# Patient Record
Sex: FEMALE | Race: WHITE | NOT HISPANIC OR LATINO | Employment: OTHER | URBAN - METROPOLITAN AREA
[De-identification: names, ages, dates, MRNs, and addresses within clinical notes are randomized per-mention and may not be internally consistent; named-entity substitution may affect disease eponyms.]

---

## 2023-10-02 ENCOUNTER — TELEPHONE (OUTPATIENT)
Dept: FAMILY MEDICINE | Facility: CLINIC | Age: 88
End: 2023-10-02
Payer: MEDICARE

## 2023-10-02 NOTE — TELEPHONE ENCOUNTER
----- Message from Sabiha Bear sent at 10/2/2023  1:07 PM CDT -----  Regarding: appointment  Contact: patient  Type:  Sooner Appointment Request    Caller is requesting a sooner appointment.  Caller declined first available appointment listed below.  Caller will not accept being placed on the waitlist and is requesting a message be sent to doctor.    Name of Caller:  patient  When is the first available appointment?    Symptoms:  medication  Best Call Back Number:  [phone] (home)     Additional Information:  Please call patient to schedule.  Thanks!

## 2023-10-02 NOTE — TELEPHONE ENCOUNTER
----- Message from Veronique Hebert sent at 10/2/2023  1:45 PM CDT -----  Contact: Patient  Type:  Patient Returning Call    Who Called:  Patient  Who Left Message for Patient:  Enriqueta  Does the patient know what this is regarding?:  Possibly an appointment    Would the patient rather a call back or a response via [facility]?   Call back  Best Call Back Number:  [phone]    Additional Information:  States she is returning a missed call - please call to advise - thank you

## 2023-10-23 ENCOUNTER — OFFICE VISIT (OUTPATIENT)
Dept: FAMILY MEDICINE | Facility: CLINIC | Age: 88
End: 2023-10-23
Payer: MEDICARE

## 2023-10-23 VITALS
DIASTOLIC BLOOD PRESSURE: 84 MMHG | HEART RATE: 62 BPM | HEIGHT: 66 IN | WEIGHT: 190.5 LBS | SYSTOLIC BLOOD PRESSURE: 200 MMHG | BODY MASS INDEX: 30.62 KG/M2 | OXYGEN SATURATION: 95 %

## 2023-10-23 DIAGNOSIS — N18.31 CHRONIC KIDNEY DISEASE, STAGE 3A: ICD-10-CM

## 2023-10-23 DIAGNOSIS — Z95.0 PACEMAKER: ICD-10-CM

## 2023-10-23 DIAGNOSIS — E78.2 MIXED HYPERLIPIDEMIA: ICD-10-CM

## 2023-10-23 DIAGNOSIS — I48.0 PAROXYSMAL ATRIAL FIBRILLATION: ICD-10-CM

## 2023-10-23 DIAGNOSIS — R00.1 SYMPTOMATIC BRADYCARDIA: ICD-10-CM

## 2023-10-23 DIAGNOSIS — I10 PRIMARY HYPERTENSION: Primary | ICD-10-CM

## 2023-10-23 DIAGNOSIS — R73.03 PREDIABETES: ICD-10-CM

## 2023-10-23 DIAGNOSIS — H35.30 MACULAR DEGENERATION OF BOTH EYES, UNSPECIFIED TYPE: ICD-10-CM

## 2023-10-23 PROCEDURE — 99213 OFFICE O/P EST LOW 20 MIN: CPT | Mod: PBBFAC,PO | Performed by: STUDENT IN AN ORGANIZED HEALTH CARE EDUCATION/TRAINING PROGRAM

## 2023-10-23 PROCEDURE — 99999 PR PBB SHADOW E&M-EST. PATIENT-LVL III: CPT | Mod: PBBFAC,,, | Performed by: STUDENT IN AN ORGANIZED HEALTH CARE EDUCATION/TRAINING PROGRAM

## 2023-10-23 PROCEDURE — 90677 PCV20 VACCINE IM: CPT | Mod: PBBFAC,PO

## 2023-10-23 PROCEDURE — 99999PBSHW FLU VACCINE - QUADRIVALENT - ADJUVANTED: Mod: PBBFAC,,,

## 2023-10-23 PROCEDURE — 99999PBSHW PNEUMOCOCCAL CONJUGATE VACCINE 20-VALENT: Mod: PBBFAC,,,

## 2023-10-23 PROCEDURE — 99204 OFFICE O/P NEW MOD 45 MIN: CPT | Mod: S$PBB,,, | Performed by: STUDENT IN AN ORGANIZED HEALTH CARE EDUCATION/TRAINING PROGRAM

## 2023-10-23 PROCEDURE — 99999PBSHW PNEUMOCOCCAL CONJUGATE VACCINE 20-VALENT: ICD-10-PCS | Mod: PBBFAC,,,

## 2023-10-23 PROCEDURE — 99999 PR PBB SHADOW E&M-EST. PATIENT-LVL III: ICD-10-PCS | Mod: PBBFAC,,, | Performed by: STUDENT IN AN ORGANIZED HEALTH CARE EDUCATION/TRAINING PROGRAM

## 2023-10-23 PROCEDURE — G0008 ADMIN INFLUENZA VIRUS VAC: HCPCS | Mod: PBBFAC,PO

## 2023-10-23 PROCEDURE — 99204 PR OFFICE/OUTPT VISIT, NEW, LEVL IV, 45-59 MIN: ICD-10-PCS | Mod: S$PBB,,, | Performed by: STUDENT IN AN ORGANIZED HEALTH CARE EDUCATION/TRAINING PROGRAM

## 2023-10-23 RX ORDER — IBUPROFEN 100 MG/5ML
1 SUSPENSION, ORAL (FINAL DOSE FORM) ORAL EVERY MORNING
COMMUNITY

## 2023-10-23 RX ORDER — CHOLECALCIFEROL (VITAMIN D3) 25 MCG
1 TABLET ORAL EVERY MORNING
COMMUNITY

## 2023-10-23 RX ORDER — AMOXICILLIN 500 MG
2 CAPSULE ORAL
COMMUNITY

## 2023-10-23 RX ORDER — AMIODARONE HYDROCHLORIDE 100 MG/1
100 TABLET ORAL DAILY
COMMUNITY

## 2023-10-23 RX ORDER — OLMESARTAN MEDOXOMIL 40 MG/1
1 TABLET ORAL DAILY
COMMUNITY

## 2023-10-23 RX ORDER — HYDROCHLOROTHIAZIDE 12.5 MG/1
25 CAPSULE ORAL
COMMUNITY
Start: 2023-10-14 | End: 2023-12-20

## 2023-10-23 NOTE — ASSESSMENT & PLAN NOTE
Elevated in today's clinic visit. No signs of hypertensive emergency. Patient has brought her home blood pressure cuff to a visit as recently as last month and it was consistent with clinic readings. Home clinic readings ranged from 120//89 mostly - occasionally systolic 150s. Given lack of emergent symptoms and normal home pressures, I will not make any changes for now.

## 2023-10-23 NOTE — ASSESSMENT & PLAN NOTE
Briefly discussed A1C 6.4 from July. Paitent should not need medication at this point - only dietary intervention and regular exercise. Will track A1C at next visit.

## 2023-10-23 NOTE — ASSESSMENT & PLAN NOTE
Patient's cholesterol levels were elevated on lab work in July. Currently not on statin medication. I am unclear on the benefit of starting a statin medication at this point. Continue to monitor. Encouraged discussion with cardiologist.

## 2023-10-23 NOTE — PROGRESS NOTES
Name: Leslye Last  MRN: [id number]  : 1935    HPI  Patient is here to establish care. Primary concerns are handled through her specialists. Within the last few weeks, ophthalmology has diagnosed macular degeneration and cardiology has found bouts of atrial fibrillation, stopping patient's sotalol and switching to amiodarone.    Review of Systems   Respiratory:  Positive for shortness of breath (on exertion).    Cardiovascular:  Negative for chest pain.   Gastrointestinal:  Negative for constipation, diarrhea, nausea and vomiting.   Neurological:  Negative for dizziness and light-headedness.   Psychiatric/Behavioral:  Positive for sleep disturbance (nocturia). Negative for dysphoric mood.         Patient Active Problem List   Diagnosis    Macular degeneration of both eyes    Pacemaker    Symptomatic bradycardia    Paroxysmal atrial fibrillation    Primary hypertension    Prediabetes    Mixed hyperlipidemia    Chronic kidney disease, stage 3a       Current Outpatient Medications on File Prior to Visit   Medication Sig Dispense Refill    amiodarone (PACERONE) 100 MG Tab Take 100 mg by mouth once daily.      ascorbic acid, vitamin C, (VITAMIN C) 1000 MG tablet Take 1 tablet by mouth every morning.      hydroCHLOROthiazide (MICROZIDE) 12.5 mg capsule Take 12.5 mg by mouth.      multivitamin capsule Take 1 capsule by mouth every morning.      olmesartan (BENICAR) 40 MG tablet Take 1 tablet by mouth once daily.      omega-3 fatty acids/fish oil (FISH OIL-OMEGA-3 FATTY ACIDS) 300-1,000 mg capsule Take 2 g by mouth.      vit C/E/Zn/coppr/lutein/zeaxan (PRESERVISION AREDS-2 ORAL) Take by mouth.      vitamin D (VITAMIN D3) 1000 units Tab Take 1 tablet by mouth every morning.      [DISCONTINUED] hydrALAZINE (APRESOLINE) 10 MG tablet Take 10 mg by mouth every 12 (twelve) hours.      [DISCONTINUED] losartan (COZAAR) 100 MG tablet Take 100 mg by mouth once daily.      [DISCONTINUED] metoprolol succinate (TOPROL-XL) 50 MG 24 hr  tablet Take 50 mg by mouth once daily.      [DISCONTINUED] predniSONE (DELTASONE) 20 MG tablet 20mg 2x/day x 2 days, then 20mg daily x 3 days then stop 7 tablet 0     No current facility-administered medications on file prior to visit.       Past Medical History:   Diagnosis Date    Bradycardia     s/p pacemaker    Diverticulitis 2010    Hypertension        Past Surgical History:   Procedure Laterality Date    EYE SURGERY      HYSTERECTOMY      INSERTION OF PACEMAKER  12/2022    LAPAROSCOPIC LEFT COLON RESECTION  2010    diverticulitis    LUMBAR SPINE SURGERY  1981    SHOULDER ARTHROSCOPY Right         No family history on file.    Social History     Socioeconomic History    Marital status:    Tobacco Use    Smoking status: Never    Smokeless tobacco: Never   Substance and Sexual Activity    Alcohol use: Yes     Comment: Less than once monthly - no more than a glass at a time    Drug use: Never   Social History Narrative    Lives alone at [facility]. Has family in the area. Enjoys reading, exercising.        Review of patient's allergies indicates:   Allergen Reactions    Amlodipine Edema and Other (See Comments)     swelling      Hydralazine Edema and Swelling    Sulfur Rash        Vitals:    10/23/23 1413   BP: (!) 200/84   Pulse:         Physical Exam  Constitutional:       General: She is not in acute distress.     Appearance: Normal appearance. She is well-developed.   HENT:      Head: Normocephalic and atraumatic.      Right Ear: External ear normal.      Left Ear: External ear normal.   Eyes:      Conjunctiva/sclera: Conjunctivae normal.   Cardiovascular:      Rate and Rhythm: Normal rate and regular rhythm.      Heart sounds: No murmur heard.     No friction rub. No gallop.   Pulmonary:      Effort: Pulmonary effort is normal. No respiratory distress.      Breath sounds: No wheezing, rhonchi or rales.   Abdominal:      General: Abdomen is flat. There is no distension.    Musculoskeletal:         General: No swelling or deformity.      Right lower le+ Pitting Edema present.      Left lower le+ Pitting Edema present.   Skin:     General: Skin is warm and dry.      Coloration: Skin is not jaundiced.   Neurological:      Mental Status: She is alert and oriented to person, place, and time. Mental status is at baseline.   Psychiatric:         Attention and Perception: Attention and perception normal.         Mood and Affect: Mood normal.         Speech: Speech normal.         Behavior: Behavior normal. Behavior is cooperative.         Thought Content: Thought content normal.         Cognition and Memory: Cognition normal.         Judgment: Judgment normal.          1. Primary hypertension  Assessment & Plan:  Elevated in today's clinic visit. No signs of hypertensive emergency. Patient has brought her home blood pressure cuff to a visit as recently as last month and it was consistent with clinic readings. Home clinic readings ranged from 120//89 mostly - occasionally systolic 150s. Given lack of emergent symptoms and normal home pressures, I will not make any changes for now.      2. Chronic kidney disease, stage 3a  Assessment & Plan:  Kidney labs from July were stable compared to the prior year. Continue to monitor.      3. Mixed hyperlipidemia  Assessment & Plan:  Patient's cholesterol levels were elevated on lab work in July. Currently not on statin medication. I am unclear on the benefit of starting a statin medication at this point. Continue to monitor. Encouraged discussion with cardiologist.      4. Prediabetes  Assessment & Plan:  Briefly discussed A1C 6.4 from July. Paitent should not need medication at this point - only dietary intervention and regular exercise. Will track A1C at next visit.      5. Macular degeneration of both eyes, unspecified type  Overview:  Follows Dr. Melgar      6. Pacemaker    7. Symptomatic bradycardia    8. Paroxysmal atrial  fibrillation    Other orders  -     (In Office Administered) Pneumococcal Conjugate Vaccine (20 Valent) (IM) (Preferred)  -     Influenza - Quadrivalent (Adjuvanted)        Follow up in 6 months.    Gianfranco Brandon MD  10/23/2023

## 2023-11-27 ENCOUNTER — OFFICE VISIT (OUTPATIENT)
Dept: URGENT CARE | Facility: CLINIC | Age: 88
End: 2023-11-27
Payer: MEDICARE

## 2023-11-27 VITALS
BODY MASS INDEX: 30.53 KG/M2 | SYSTOLIC BLOOD PRESSURE: 170 MMHG | OXYGEN SATURATION: 97 % | TEMPERATURE: 99 F | HEIGHT: 66 IN | DIASTOLIC BLOOD PRESSURE: 80 MMHG | HEART RATE: 77 BPM | RESPIRATION RATE: 16 BRPM | WEIGHT: 190 LBS

## 2023-11-27 DIAGNOSIS — R06.2 WHEEZING: ICD-10-CM

## 2023-11-27 DIAGNOSIS — J20.8 ACUTE BACTERIAL BRONCHITIS: Primary | ICD-10-CM

## 2023-11-27 DIAGNOSIS — R05.9 COUGH, UNSPECIFIED TYPE: ICD-10-CM

## 2023-11-27 DIAGNOSIS — B96.89 ACUTE BACTERIAL BRONCHITIS: Primary | ICD-10-CM

## 2023-11-27 DIAGNOSIS — R09.81 NASAL CONGESTION: ICD-10-CM

## 2023-11-27 LAB
CTP QC/QA: YES
SARS-COV-2 AG RESP QL IA.RAPID: NEGATIVE

## 2023-11-27 PROCEDURE — 99213 OFFICE O/P EST LOW 20 MIN: CPT | Mod: S$GLB,,, | Performed by: PHYSICIAN ASSISTANT

## 2023-11-27 PROCEDURE — 99213 PR OFFICE/OUTPT VISIT, EST, LEVL III, 20-29 MIN: ICD-10-PCS | Mod: S$GLB,,, | Performed by: PHYSICIAN ASSISTANT

## 2023-11-27 PROCEDURE — 87811 SARS CORONAVIRUS 2 ANTIGEN POCT, MANUAL READ: ICD-10-PCS | Mod: QW,S$GLB,, | Performed by: PHYSICIAN ASSISTANT

## 2023-11-27 PROCEDURE — 87811 SARS-COV-2 COVID19 W/OPTIC: CPT | Mod: QW,S$GLB,, | Performed by: PHYSICIAN ASSISTANT

## 2023-11-27 RX ORDER — ALBUTEROL SULFATE 90 UG/1
2 AEROSOL, METERED RESPIRATORY (INHALATION) EVERY 6 HOURS PRN
Qty: 18 G | Refills: 3 | Status: SHIPPED | OUTPATIENT
Start: 2023-11-27

## 2023-11-27 RX ORDER — DOXYCYCLINE HYCLATE 100 MG
100 TABLET ORAL 2 TIMES DAILY
Qty: 20 TABLET | Refills: 0 | Status: SHIPPED | OUTPATIENT
Start: 2023-11-27 | End: 2023-12-07

## 2023-11-27 RX ORDER — AMOXICILLIN AND CLAVULANATE POTASSIUM 875; 125 MG/1; MG/1
1 TABLET, FILM COATED ORAL 2 TIMES DAILY
Qty: 20 TABLET | Refills: 0 | Status: SHIPPED | OUTPATIENT
Start: 2023-11-27 | End: 2023-12-07

## 2023-11-27 RX ORDER — PREDNISONE 20 MG/1
TABLET ORAL
Qty: 10 TABLET | Refills: 0 | Status: SHIPPED | OUTPATIENT
Start: 2023-11-27 | End: 2023-12-20

## 2023-11-27 NOTE — PROGRESS NOTES
"Subjective:      Patient ID: Leslye Last is a 88 y.o. female.    Vitals:  height is 5' 6" (1.676 m) and weight is 86.2 kg (190 lb). Her oral temperature is 98.7 °F (37.1 °C). Her blood pressure is 170/80 (abnormal) and her pulse is 77. Her respiration is 16 and oxygen saturation is 97%.     Chief Complaint: Sinus Problem    Sinus Problem  This is a new problem. The current episode started 1 to 4 weeks ago. The problem has been gradually worsening since onset. There has been no fever. Her pain is at a severity of 3/10. The pain is mild. Associated symptoms include congestion, coughing, headaches, sinus pressure and a sore throat. Pertinent negatives include no chills, diaphoresis, ear pain, hoarse voice, neck pain, shortness of breath, sneezing or swollen glands. Past treatments include nothing.     Constitution: Negative for chills, sweating, fatigue and fever.   HENT:  Positive for congestion, postnasal drip, sinus pain, sinus pressure and sore throat. Negative for ear pain, drooling, nosebleeds, foreign body in nose, trouble swallowing and voice change.    Neck: Negative for neck pain, neck stiffness, painful lymph nodes and neck swelling.   Cardiovascular:  Negative for chest pain, leg swelling, palpitations, sob on exertion and passing out.   Eyes:  Negative for eye pain, eye redness, photophobia, double vision, blurred vision and eyelid swelling.   Respiratory:  Positive for cough and wheezing. Negative for chest tightness, sputum production, bloody sputum, shortness of breath and stridor.    Gastrointestinal:  Negative for abdominal pain, abdominal bloating, nausea, vomiting, constipation, diarrhea and heartburn.   Musculoskeletal:  Negative for joint pain, joint swelling, abnormal ROM of joint, back pain, muscle cramps and muscle ache.   Skin:  Negative for rash and hives.   Allergic/Immunologic: Negative for seasonal allergies, food allergies, hives, itching and sneezing.   Neurological:  Positive for " headaches. Negative for dizziness, light-headedness, passing out, facial drooping, speech difficulty, loss of balance, altered mental status, loss of consciousness and seizures.   Hematologic/Lymphatic: Negative for swollen lymph nodes.   Psychiatric/Behavioral:  Negative for altered mental status and nervous/anxious. The patient is not nervous/anxious.       Objective:     Physical Exam   Constitutional: She is oriented to person, place, and time. She appears well-developed. She is cooperative.  Non-toxic appearance. She does not appear ill. No distress.   HENT:   Head: Normocephalic and atraumatic.   Ears:   Right Ear: Hearing, tympanic membrane, external ear and ear canal normal.   Left Ear: Hearing, tympanic membrane, external ear and ear canal normal.   Nose: Mucosal edema and rhinorrhea present. No nasal deformity. No epistaxis. Right sinus exhibits no maxillary sinus tenderness and no frontal sinus tenderness. Left sinus exhibits no maxillary sinus tenderness and no frontal sinus tenderness.   Mouth/Throat: Uvula is midline and mucous membranes are normal. No trismus in the jaw. Normal dentition. No uvula swelling. Posterior oropharyngeal erythema and cobblestoning present. No oropharyngeal exudate, posterior oropharyngeal edema or tonsillar abscesses. No tonsillar exudate.   Eyes: Conjunctivae and lids are normal. No scleral icterus.   Neck: Trachea normal and phonation normal. Neck supple. No edema present. No erythema present. No neck rigidity present.   Cardiovascular: Normal rate, regular rhythm, normal heart sounds and normal pulses.   Pulmonary/Chest: Effort normal. No accessory muscle usage or stridor. No respiratory distress. She has decreased breath sounds. She has wheezes. She has no rhonchi. She has no rales.   Abdominal: Normal appearance.   Musculoskeletal: Normal range of motion.         General: No deformity or edema. Normal range of motion.   Lymphadenopathy:     She has no cervical  adenopathy.   Neurological: She is alert and oriented to person, place, and time. She exhibits normal muscle tone. Coordination normal.   Skin: Skin is warm, dry, intact, not diaphoretic, not pale and no rash. Capillary refill takes less than 2 seconds.   Psychiatric: Her speech is normal and behavior is normal. Judgment and thought content normal.   Nursing note and vitals reviewed.    Results for orders placed or performed in visit on 11/27/23   SARS Coronavirus 2 Antigen, POCT Manual Read   Result Value Ref Range    SARS Coronavirus 2 Antigen Negative Negative     Acceptable Yes      Assessment:     1. Acute bacterial bronchitis    2. Wheezing    3. Cough, unspecified type    4. Nasal congestion      Plan:   Discussed the limitations in the urgent care setting with patient. Discussed DDX and treatment options with patient. Discussed that we do not have XRAY on site today at this  location. Will go ahead and treat for possible bronchitis/pneumonia at this time. Advised close follow-up with PCP and/or Specialist for further evaluation as needed. Strict ER precautions given to patient as well. Patient aware, verbalized understanding and agreed with plan of care.    Acute bacterial bronchitis    Wheezing  -     SARS Coronavirus 2 Antigen, POCT Manual Read  -     albuterol (PROVENTIL HFA) 90 mcg/actuation inhaler; Inhale 2 puffs into the lungs every 6 (six) hours as needed for Wheezing or Shortness of Breath. Rescue  Dispense: 18 g; Refill: 3    Cough, unspecified type  -     SARS Coronavirus 2 Antigen, POCT Manual Read  -     albuterol (PROVENTIL HFA) 90 mcg/actuation inhaler; Inhale 2 puffs into the lungs every 6 (six) hours as needed for Wheezing or Shortness of Breath. Rescue  Dispense: 18 g; Refill: 3    Nasal congestion  -     SARS Coronavirus 2 Antigen, POCT Manual Read    Other orders  -     amoxicillin-clavulanate 875-125mg (AUGMENTIN) 875-125 mg per tablet; Take 1 tablet by mouth 2 (two)  times daily. for 10 days  Dispense: 20 tablet; Refill: 0  -     doxycycline (VIBRA-TABS) 100 MG tablet; Take 1 tablet (100 mg total) by mouth 2 (two) times daily. for 10 days  Dispense: 20 tablet; Refill: 0  -     predniSONE (DELTASONE) 20 MG tablet; Take 40mg (2 tablets) x 2 days, 30mg (1.5 tablets) x 2 days, 20mg (1 tablet) x 2 days, 10mg (0.5 tablet) x 2 days.  Dispense: 10 tablet; Refill: 0      There are no Patient Instructions on file for this visit.

## 2023-11-29 ENCOUNTER — TELEPHONE (OUTPATIENT)
Dept: FAMILY MEDICINE | Facility: CLINIC | Age: 88
End: 2023-11-29

## 2023-11-29 NOTE — TELEPHONE ENCOUNTER
Spoke with patient's daughter, Juliana.     Patient went to  on Monday. She would like you to review the visit and advise if the medications prescribed are okay for Patient and would you like a f/u?

## 2023-11-29 NOTE — TELEPHONE ENCOUNTER
----- Message from Radha Bahena sent at 11/29/2023 10:13 AM CST -----  Contact: Juliana (daughter)  Type:  Needs Medical Advice    Who Called: Juliana    Would the patient rather a call back or a response via [facility]? Call back    Best Call Back Number:  (daughter)    Additional Information: Pt was seen in  on Monday and they want to let you know what happened and what meds they put her on.    predniSONE (DELTASONE) 20 MG tablet  albuterol (PROVENTIL HFA) 90 mcg/actuation inhaler  amoxicillin-clavulanate 875-125mg (AUGMENTIN) 875-125 mg per tablet  doxycycline (VIBRA-TABS) 100 MG tablet      Wants to make sure you knew and were okay with it given her other health issues and medications.  Also, do you want to see her this Friday or next week for a follow up?    Please call to advise  Thanks

## 2023-12-06 ENCOUNTER — TELEPHONE (OUTPATIENT)
Dept: FAMILY MEDICINE | Facility: CLINIC | Age: 88
End: 2023-12-06
Payer: MEDICARE

## 2023-12-06 NOTE — TELEPHONE ENCOUNTER
----- Message from Teenaarlene Brink sent at 12/6/2023 12:03 PM CST -----  Type: Needs Medical Advice  Who Called:  pt's daughter Laquita  Symptoms (please be specific):  pneumonia  How long has patient had these symptoms:  since 11/27  Pharmacy name and phone #:    [facility] - [place] - [address]  [address]  [place] [address]  Phone: [phone] Fax: [phone]      Best Call Back Number: [phone]    Additional Information: Laquita is calling in regards to being seen urgent care last month for pneumonia and has been on antibiotics. Pt has one more dose for tomorrow but is still not feeling well. Daughter wants to know if the pt needs to have an appt for her to be seen or if Dr. Brandon wants to send in another prescription for her. Pt may feel better coming in to be seen because she has some worries. Please call back and advise. Thanks!

## 2023-12-06 NOTE — TELEPHONE ENCOUNTER
Spoke with Patient's Mom, Laquita. She is going to call her mom and call us back regarding an appointment with aur[facility] hinojosa.

## 2023-12-20 ENCOUNTER — HOSPITAL ENCOUNTER (OUTPATIENT)
Dept: RADIOLOGY | Facility: HOSPITAL | Age: 88
Discharge: HOME | End: 2023-12-20
Payer: MEDICARE

## 2023-12-20 ENCOUNTER — TELEPHONE (OUTPATIENT)
Dept: FAMILY MEDICINE | Facility: CLINIC | Age: 88
End: 2023-12-20

## 2023-12-20 ENCOUNTER — OFFICE VISIT (OUTPATIENT)
Dept: FAMILY MEDICINE | Facility: CLINIC | Age: 88
End: 2023-12-20
Payer: MEDICARE

## 2023-12-20 VITALS
DIASTOLIC BLOOD PRESSURE: 78 MMHG | HEART RATE: 80 BPM | WEIGHT: 183.88 LBS | SYSTOLIC BLOOD PRESSURE: 164 MMHG | OXYGEN SATURATION: 97 % | BODY MASS INDEX: 29.68 KG/M2

## 2023-12-20 DIAGNOSIS — R53.83 FATIGUE, UNSPECIFIED TYPE: Primary | ICD-10-CM

## 2023-12-20 DIAGNOSIS — R06.02 SOB (SHORTNESS OF BREATH) ON EXERTION: ICD-10-CM

## 2023-12-20 DIAGNOSIS — R53.83 FATIGUE, UNSPECIFIED TYPE: ICD-10-CM

## 2023-12-20 DIAGNOSIS — R53.81 PHYSICAL DECONDITIONING: ICD-10-CM

## 2023-12-20 DIAGNOSIS — E78.2 MIXED HYPERLIPIDEMIA: ICD-10-CM

## 2023-12-20 DIAGNOSIS — N18.31 CHRONIC KIDNEY DISEASE, STAGE 3A: ICD-10-CM

## 2023-12-20 DIAGNOSIS — R73.03 PREDIABETES: ICD-10-CM

## 2023-12-20 DIAGNOSIS — R06.02 SOB (SHORTNESS OF BREATH) ON EXERTION: Primary | ICD-10-CM

## 2023-12-20 DIAGNOSIS — R53.81 PHYSICAL DECONDITIONING: Primary | ICD-10-CM

## 2023-12-20 PROCEDURE — 71046 X-RAY EXAM CHEST 2 VIEWS: CPT | Mod: TC,FY,PO

## 2023-12-20 PROCEDURE — 99214 OFFICE O/P EST MOD 30 MIN: CPT | Mod: S$PBB,,,

## 2023-12-20 PROCEDURE — 71046 XR CHEST PA AND LATERAL: ICD-10-PCS | Mod: 26,,, | Performed by: RADIOLOGY

## 2023-12-20 PROCEDURE — 99214 PR OFFICE/OUTPT VISIT, EST, LEVL IV, 30-39 MIN: ICD-10-PCS | Mod: S$PBB,,,

## 2023-12-20 PROCEDURE — 99214 OFFICE O/P EST MOD 30 MIN: CPT | Mod: PBBFAC,PO,25

## 2023-12-20 PROCEDURE — 71046 X-RAY EXAM CHEST 2 VIEWS: CPT | Mod: 26,,, | Performed by: RADIOLOGY

## 2023-12-20 PROCEDURE — 99999 PR PBB SHADOW E&M-EST. PATIENT-LVL IV: ICD-10-PCS | Mod: PBBFAC,,,

## 2023-12-20 PROCEDURE — 99999 PR PBB SHADOW E&M-EST. PATIENT-LVL IV: CPT | Mod: PBBFAC,,,

## 2023-12-20 NOTE — PROGRESS NOTES
Name: Leslye Last  MRN: [id number]  : 1935  PCP: Gianfranco Brandon MD    HPI    Patient follows with Dr. Last, new to me. Presents following treatment for PNA after thanksgiving. She denies any SOB, chest pain, fevers, or myalgias. Reports increasing fatigue following treatment for PNA. Also reports occasional wheezing. Denies any cough. Treated with antibiotics and prednisone. She does monitor her BP at home and states runs in the 130s systolic. Elevated in clinic today. She has tried multiple BP medications in the past with reactions to most medications. Follows with cardiology for BP management.     Review of Systems   Constitutional:  Positive for fatigue. Negative for diaphoresis and fever.   Respiratory:  Negative for cough, shortness of breath and wheezing.    Cardiovascular:  Negative for chest pain and palpitations.   Gastrointestinal:  Negative for nausea and vomiting.       Patient Active Problem List   Diagnosis    Macular degeneration of both eyes    Pacemaker    Symptomatic bradycardia    Paroxysmal atrial fibrillation    Primary hypertension    Prediabetes    Mixed hyperlipidemia    Chronic kidney disease, stage 3a       Vitals:    23 0811   BP: (!) 164/78   Pulse: 80       Physical Exam  Constitutional:       General: She is not in acute distress.     Appearance: She is well-developed.   HENT:      Head: Normocephalic and atraumatic.      Right Ear: External ear normal.      Left Ear: External ear normal.   Eyes:      Conjunctiva/sclera: Conjunctivae normal.      Pupils: Pupils are equal, round, and reactive to light.   Neck:      Thyroid: No thyromegaly.   Cardiovascular:      Rate and Rhythm: Normal rate and regular rhythm.      Pulses: Normal pulses.      Heart sounds: Normal heart sounds, S1 normal and S2 normal.   Pulmonary:      Effort: Pulmonary effort is normal. No respiratory distress.      Breath sounds: Normal breath sounds.   Chest:      Chest wall: No tenderness.    Musculoskeletal:         General: No swelling or tenderness. Normal range of motion.      Cervical back: Normal range of motion and neck supple.   Skin:     General: Skin is warm and dry.      Coloration: Skin is not jaundiced or pale.   Neurological:      General: No focal deficit present.      Mental Status: She is alert and oriented to person, place, and time.      Cranial Nerves: No cranial nerve deficit.   Psychiatric:         Mood and Affect: Mood normal.         Behavior: Behavior normal.         1. SOB (shortness of breath) on exertion  -     X-Ray Chest PA And Lateral; Future; Expected date: 12/20/2023    2. Mixed hyperlipidemia   Lipid panel reviewed. Continue omega-3.     3. Chronic kidney disease, stage 3a  -     COMPREHENSIVE METABOLIC PANEL; Future; Expected date: 12/20/2023  -     CBC W/ AUTO DIFFERENTIAL; Future; Expected date: 12/20/2023    4. Prediabetes  -     HEMOGLOBIN A1C; Future; Expected date: 12/20/2023    5. Fatigue, unspecified type  -     Ambulatory referral/consult to Physical/Occupational Therapy; Future; Expected date: 12/27/2023    6. Essential hypertension  Elevated in clinic today. Continue on current BP medications. She follows with cardiology in Feb for further management.     Follow up as needed or if symptoms fail to improve      JARROD Kendall  12/20/2023

## 2023-12-20 NOTE — TELEPHONE ENCOUNTER
Returned Patient call, she was seen in office today. Requesting PT referral be placed to [facility] and also requesting order for a walking cane. Please advise.

## 2023-12-20 NOTE — TELEPHONE ENCOUNTER
----- Message from Amanda Theodore sent at 12/20/2023 11:18 AM CST -----  Pt was seen in the office today and set up PT, however some changes are needing to be made and they are asking that someone in the office give them a call back. Please call back to advice, thank you!      [phone]       No

## 2023-12-26 ENCOUNTER — TELEPHONE (OUTPATIENT)
Dept: FAMILY MEDICINE | Facility: CLINIC | Age: 88
End: 2023-12-26

## 2023-12-26 NOTE — TELEPHONE ENCOUNTER
Spoke to pt daughter. She is requesting lab results from 12/20. Please Advise in JARROD Martinez absence

## 2023-12-26 NOTE — TELEPHONE ENCOUNTER
----- Message from Melanie Guidry sent at 12/26/2023  9:18 AM CST -----  Contact: Laquita  Type:  Needs Medical Advice    Who Called: daughter Laquita  Symptoms (please be specific): daughter needs to speak to nurse or dr about results they received from pts bloodwork, needs to know the next steps.   Would the patient rather a call back or a response via [facility]? call  Best Call Back Number:     Additional Information: please advise and thank you.

## 2024-04-10 ENCOUNTER — LAB VISIT (OUTPATIENT)
Dept: LAB | Facility: HOSPITAL | Age: 89
End: 2024-04-10
Payer: MEDICARE

## 2024-04-10 ENCOUNTER — OFFICE VISIT (OUTPATIENT)
Dept: FAMILY MEDICINE | Facility: CLINIC | Age: 89
End: 2024-04-10
Payer: MEDICARE

## 2024-04-10 VITALS
BODY MASS INDEX: 30.78 KG/M2 | WEIGHT: 190.69 LBS | SYSTOLIC BLOOD PRESSURE: 138 MMHG | OXYGEN SATURATION: 97 % | HEART RATE: 64 BPM | DIASTOLIC BLOOD PRESSURE: 82 MMHG

## 2024-04-10 DIAGNOSIS — M79.661 PAIN AND SWELLING OF LOWER LEG, RIGHT: Primary | ICD-10-CM

## 2024-04-10 DIAGNOSIS — N18.31 CHRONIC KIDNEY DISEASE, STAGE 3A: ICD-10-CM

## 2024-04-10 DIAGNOSIS — R73.03 PREDIABETES: ICD-10-CM

## 2024-04-10 DIAGNOSIS — M79.89 PAIN AND SWELLING OF LOWER LEG, RIGHT: Primary | ICD-10-CM

## 2024-04-10 DIAGNOSIS — I48.0 PAROXYSMAL ATRIAL FIBRILLATION: ICD-10-CM

## 2024-04-10 LAB
ALBUMIN SERPL BCP-MCNC: 3.9 G/DL (ref 3.5–5.2)
ALP SERPL-CCNC: 104 U/L (ref 55–135)
ALT SERPL W/O P-5'-P-CCNC: 18 U/L (ref 10–44)
ANION GAP SERPL CALC-SCNC: 9 MMOL/L (ref 8–16)
AST SERPL-CCNC: 23 U/L (ref 10–40)
BILIRUB SERPL-MCNC: 0.4 MG/DL (ref 0.1–1)
BUN SERPL-MCNC: 33 MG/DL (ref 8–23)
CALCIUM SERPL-MCNC: 10.6 MG/DL (ref 8.7–10.5)
CHLORIDE SERPL-SCNC: 103 MMOL/L (ref 95–110)
CO2 SERPL-SCNC: 27 MMOL/L (ref 23–29)
CREAT SERPL-MCNC: 0.9 MG/DL (ref 0.5–1.4)
EST. GFR  (NO RACE VARIABLE): >60 ML/MIN/1.73 M^2
ESTIMATED AVG GLUCOSE: 120 MG/DL (ref 68–131)
GLUCOSE SERPL-MCNC: 116 MG/DL (ref 70–110)
HBA1C MFR BLD: 5.8 % (ref 4–5.6)
POTASSIUM SERPL-SCNC: 4.6 MMOL/L (ref 3.5–5.1)
PROT SERPL-MCNC: 7.2 G/DL (ref 6–8.4)
SODIUM SERPL-SCNC: 139 MMOL/L (ref 136–145)

## 2024-04-10 PROCEDURE — 36415 COLL VENOUS BLD VENIPUNCTURE: CPT | Mod: PO | Performed by: STUDENT IN AN ORGANIZED HEALTH CARE EDUCATION/TRAINING PROGRAM

## 2024-04-10 PROCEDURE — 83036 HEMOGLOBIN GLYCOSYLATED A1C: CPT | Performed by: STUDENT IN AN ORGANIZED HEALTH CARE EDUCATION/TRAINING PROGRAM

## 2024-04-10 PROCEDURE — 80053 COMPREHEN METABOLIC PANEL: CPT | Performed by: STUDENT IN AN ORGANIZED HEALTH CARE EDUCATION/TRAINING PROGRAM

## 2024-04-10 PROCEDURE — 99214 OFFICE O/P EST MOD 30 MIN: CPT | Mod: S$PBB,,, | Performed by: STUDENT IN AN ORGANIZED HEALTH CARE EDUCATION/TRAINING PROGRAM

## 2024-04-10 PROCEDURE — 99213 OFFICE O/P EST LOW 20 MIN: CPT | Mod: PBBFAC,PO | Performed by: STUDENT IN AN ORGANIZED HEALTH CARE EDUCATION/TRAINING PROGRAM

## 2024-04-10 PROCEDURE — 99999 PR PBB SHADOW E&M-EST. PATIENT-LVL III: CPT | Mod: PBBFAC,,, | Performed by: STUDENT IN AN ORGANIZED HEALTH CARE EDUCATION/TRAINING PROGRAM

## 2024-04-10 RX ORDER — METOPROLOL SUCCINATE 50 MG/1
1 TABLET, EXTENDED RELEASE ORAL DAILY
COMMUNITY

## 2024-04-10 RX ORDER — HYDROCHLOROTHIAZIDE 25 MG/1
25 TABLET ORAL DAILY
COMMUNITY

## 2024-04-10 NOTE — ASSESSMENT & PLAN NOTE
Patient was briefly on amiodarone but had significant side effects. Only on metoprolol for anti-arrhythmic. Not on any blood thinners. Follow up with cardiology.

## 2024-04-10 NOTE — PROGRESS NOTES
Name: Leslye Last  MRN: [id number]  : 1935  PCP: Gianfranco Brandon MD    Subjective   Patient presents for regular follow up.    She had a mechanical fall (turned and tripped over loose footwear) resulting in pain and swelling. She's had two ER visits in the interval. She has had a course of cephalexin and doxycycline. Also had one round of IV antibiotics in ER (presumed ceftriaxone). Pain and swelling are persistent. Has not had Bactrim for this - she gets a significant rash if she takes it.     Review of Systems   All other systems reviewed and are negative.      Patient Active Problem List   Diagnosis    Macular degeneration of both eyes    Pacemaker    Symptomatic bradycardia    Paroxysmal atrial fibrillation    Primary hypertension    Prediabetes    Mixed hyperlipidemia    Chronic kidney disease, stage 3a    Pain and swelling of lower leg, right       Health Maintenance Due   Topic Date Due    Annual UACr  Never done    Shingles Vaccine (1 of 2) Never done    RSV Vaccine (Age 60+ and Pregnant patients) (1 - 1-dose 60+ series) Never done    COVID-19 Vaccine (2023- season) 2023       Objective   Vitals:    04/10/24 1452   BP: 138/82   Pulse: 64       Physical Exam  Constitutional:       General: She is not in acute distress.     Appearance: Normal appearance. She is well-developed.   HENT:      Head: Normocephalic and atraumatic.      Right Ear: External ear normal.      Left Ear: External ear normal.   Eyes:      Conjunctiva/sclera: Conjunctivae normal.   Pulmonary:      Effort: Pulmonary effort is normal. No respiratory distress.   Abdominal:      General: Abdomen is flat. There is no distension.   Musculoskeletal:         General: No swelling or deformity.      Right lower le+ Pitting Edema present.      Left lower leg: No edema.   Skin:     General: Skin is warm and dry.      Coloration: Skin is not jaundiced.   Neurological:      Mental Status: She is alert and oriented to person,  place, and time. Mental status is at baseline.   Psychiatric:         Attention and Perception: Attention and perception normal.         Mood and Affect: Mood normal.         Speech: Speech normal.         Behavior: Behavior normal. Behavior is cooperative.         Thought Content: Thought content normal.         Cognition and Memory: Cognition normal.         Judgment: Judgment normal.         Assessment & Plan   1. Pain and swelling of lower leg, right  Assessment & Plan:  Evaluate for DVT. If negative, start clindamycin.    Orders:  -     US Lower Extremity Veins Right; Future; Expected date: 04/10/2024    2. Chronic kidney disease, stage 3a  -     Comprehensive Metabolic Panel; Future; Expected date: 04/10/2024    3. Prediabetes  -     Hemoglobin A1C; Future    4. Paroxysmal atrial fibrillation  Assessment & Plan:  Patient was briefly on amiodarone but had significant side effects. Only on metoprolol for anti-arrhythmic. Not on any blood thinners. Follow up with cardiology.          Follow up in 6 months.     Gianfranco Brandon MD  04/10/2024

## 2024-04-11 DIAGNOSIS — E83.52 HYPERCALCEMIA: Primary | ICD-10-CM

## 2024-04-12 ENCOUNTER — TELEPHONE (OUTPATIENT)
Dept: FAMILY MEDICINE | Facility: CLINIC | Age: 89
End: 2024-04-12
Payer: MEDICARE

## 2024-04-12 ENCOUNTER — HOSPITAL ENCOUNTER (OUTPATIENT)
Dept: RADIOLOGY | Facility: HOSPITAL | Age: 89
Discharge: HOME | End: 2024-04-12
Attending: STUDENT IN AN ORGANIZED HEALTH CARE EDUCATION/TRAINING PROGRAM
Payer: MEDICARE

## 2024-04-12 DIAGNOSIS — M79.661 PAIN AND SWELLING OF LOWER LEG, RIGHT: ICD-10-CM

## 2024-04-12 DIAGNOSIS — M79.89 PAIN AND SWELLING OF LOWER LEG, RIGHT: ICD-10-CM

## 2024-04-12 PROCEDURE — 93971 EXTREMITY STUDY: CPT | Mod: TC,PO,RT

## 2024-04-12 PROCEDURE — 93971 EXTREMITY STUDY: CPT | Mod: 26,RT,, | Performed by: RADIOLOGY

## 2024-04-12 NOTE — TELEPHONE ENCOUNTER
----- Message from Veronique Hebert sent at 4/12/2024  2:30 PM CDT -----  Contact: Patient  Type:  Patient Returning Call    Who Called:  Patient  Who Left Message for Patient:   Doesn't know  Does the patient know what this is regarding?:   Ultrasound appointment    Would the patient rather a call back or a response via [facility]?   Call back  Best Call Back Number:  [phone]    Additional Information:   States she is returning a missed call - please call back - thank you

## 2024-04-15 PROBLEM — E21.3 HYPERPARATHYROIDISM: Status: ACTIVE | Noted: 2024-04-15

## 2024-04-15 RX ORDER — CLINDAMYCIN HYDROCHLORIDE 150 MG/1
450 CAPSULE ORAL 3 TIMES DAILY
Qty: 63 CAPSULE | Refills: 0 | Status: SHIPPED | OUTPATIENT
Start: 2024-04-15 | End: 2024-04-22

## 2024-04-26 ENCOUNTER — PATIENT OUTREACH (OUTPATIENT)
Dept: ADMINISTRATIVE | Facility: HOSPITAL | Age: 89
End: 2024-04-26
Payer: MEDICARE

## 2024-04-26 NOTE — PROGRESS NOTES
Population Health Chart Review & Patient Outreach Details      Additional [facility] Health Notes:               Updates Requested / Reviewed:      Updated Care Coordination Note, , and Immunizations Reconciliation Completed or Queried: [facility] Topics Overdue:      [facility] Score: 0     Patient is not due for any topics at this time.    Shingles/Zoster Vaccine  RSV Vaccine                  Health Maintenance Topic(s) Outreach Outcomes & Actions Taken:    Eye Exam - Outreach Outcomes & Actions Taken  : Non-Diabetic Eye External Records Uploaded, Care Team & History Updated if Applicable

## 2024-08-27 ENCOUNTER — PATIENT MESSAGE (OUTPATIENT)
Dept: FAMILY MEDICINE | Facility: CLINIC | Age: 89
End: 2024-08-27
Payer: MEDICARE

## 2024-12-02 PROBLEM — I34.0 MILD MITRAL REGURGITATION: Status: ACTIVE | Noted: 2024-12-02

## 2024-12-02 PROBLEM — I51.89 DIASTOLIC DYSFUNCTION: Status: ACTIVE | Noted: 2024-12-02

## 2024-12-02 PROBLEM — I37.1 MILD PULMONARY VALVE REGURGITATION: Status: ACTIVE | Noted: 2024-12-02

## 2024-12-02 PROBLEM — I07.1 MILD TRICUSPID REGURGITATION: Status: ACTIVE | Noted: 2024-12-02

## 2024-12-31 ENCOUNTER — LAB VISIT (OUTPATIENT)
Dept: LAB | Facility: HOSPITAL | Age: 89
End: 2024-12-31
Attending: STUDENT IN AN ORGANIZED HEALTH CARE EDUCATION/TRAINING PROGRAM
Payer: MEDICARE

## 2024-12-31 ENCOUNTER — OFFICE VISIT (OUTPATIENT)
Dept: FAMILY MEDICINE | Facility: CLINIC | Age: 89
End: 2024-12-31
Payer: MEDICARE

## 2024-12-31 VITALS
OXYGEN SATURATION: 97 % | BODY MASS INDEX: 29.74 KG/M2 | HEART RATE: 77 BPM | DIASTOLIC BLOOD PRESSURE: 64 MMHG | WEIGHT: 185.06 LBS | HEIGHT: 66 IN | SYSTOLIC BLOOD PRESSURE: 178 MMHG

## 2024-12-31 DIAGNOSIS — R73.03 PREDIABETES: ICD-10-CM

## 2024-12-31 DIAGNOSIS — I48.0 PAROXYSMAL ATRIAL FIBRILLATION: ICD-10-CM

## 2024-12-31 DIAGNOSIS — E78.2 MIXED HYPERLIPIDEMIA: ICD-10-CM

## 2024-12-31 DIAGNOSIS — I10 PRIMARY HYPERTENSION: Primary | ICD-10-CM

## 2024-12-31 DIAGNOSIS — G47.39 OTHER SLEEP APNEA: ICD-10-CM

## 2024-12-31 DIAGNOSIS — R40.0 DAYTIME SOMNOLENCE: ICD-10-CM

## 2024-12-31 DIAGNOSIS — N18.31 CHRONIC KIDNEY DISEASE, STAGE 3A: ICD-10-CM

## 2024-12-31 DIAGNOSIS — E21.3 HYPERPARATHYROIDISM: ICD-10-CM

## 2024-12-31 DIAGNOSIS — R06.83 SNORING: ICD-10-CM

## 2024-12-31 PROBLEM — M79.661 PAIN AND SWELLING OF LOWER LEG, RIGHT: Status: RESOLVED | Noted: 2024-04-10 | Resolved: 2024-12-31

## 2024-12-31 PROBLEM — M79.89 PAIN AND SWELLING OF LOWER LEG, RIGHT: Status: RESOLVED | Noted: 2024-04-10 | Resolved: 2024-12-31

## 2024-12-31 LAB
ALBUMIN SERPL BCP-MCNC: 3.9 G/DL (ref 3.5–5.2)
ALP SERPL-CCNC: 187 U/L (ref 40–150)
ALT SERPL W/O P-5'-P-CCNC: 159 U/L (ref 10–44)
ANION GAP SERPL CALC-SCNC: 11 MMOL/L (ref 8–16)
AST SERPL-CCNC: 49 U/L (ref 10–40)
BILIRUB SERPL-MCNC: 0.6 MG/DL (ref 0.1–1)
BUN SERPL-MCNC: 29 MG/DL (ref 8–23)
CALCIUM SERPL-MCNC: 10.3 MG/DL (ref 8.7–10.5)
CHLORIDE SERPL-SCNC: 103 MMOL/L (ref 95–110)
CHOLEST SERPL-MCNC: 220 MG/DL (ref 120–199)
CHOLEST/HDLC SERPL: 5.4 {RATIO} (ref 2–5)
CO2 SERPL-SCNC: 26 MMOL/L (ref 23–29)
CREAT SERPL-MCNC: 1.1 MG/DL (ref 0.5–1.4)
ERYTHROCYTE [DISTWIDTH] IN BLOOD BY AUTOMATED COUNT: 14.8 % (ref 11.5–14.5)
EST. GFR  (NO RACE VARIABLE): 49 ML/MIN/1.73 M^2
ESTIMATED AVG GLUCOSE: 126 MG/DL (ref 68–131)
GLUCOSE SERPL-MCNC: 107 MG/DL (ref 70–110)
HBA1C MFR BLD: 6 % (ref 4–5.6)
HCT VFR BLD AUTO: 40 % (ref 37–48.5)
HDLC SERPL-MCNC: 41 MG/DL (ref 40–75)
HDLC SERPL: 18.6 % (ref 20–50)
HGB BLD-MCNC: 13 G/DL (ref 12–16)
LDLC SERPL CALC-MCNC: 124.8 MG/DL (ref 63–159)
MCH RBC QN AUTO: 28.9 PG (ref 27–31)
MCHC RBC AUTO-ENTMCNC: 32.5 G/DL (ref 32–36)
MCV RBC AUTO: 89 FL (ref 82–98)
NONHDLC SERPL-MCNC: 179 MG/DL
PHOSPHATE SERPL-MCNC: 2.9 MG/DL (ref 2.7–4.5)
PLATELET # BLD AUTO: 169 K/UL (ref 150–450)
PMV BLD AUTO: 11.9 FL (ref 9.2–12.9)
POTASSIUM SERPL-SCNC: 4.7 MMOL/L (ref 3.5–5.1)
PROT SERPL-MCNC: 7.2 G/DL (ref 6–8.4)
PTH-INTACT SERPL-MCNC: 107.1 PG/ML (ref 9–77)
RBC # BLD AUTO: 4.5 M/UL (ref 4–5.4)
SODIUM SERPL-SCNC: 140 MMOL/L (ref 136–145)
TRIGL SERPL-MCNC: 271 MG/DL (ref 30–150)
WBC # BLD AUTO: 11.2 K/UL (ref 3.9–12.7)

## 2024-12-31 PROCEDURE — 80061 LIPID PANEL: CPT | Performed by: STUDENT IN AN ORGANIZED HEALTH CARE EDUCATION/TRAINING PROGRAM

## 2024-12-31 PROCEDURE — 80053 COMPREHEN METABOLIC PANEL: CPT | Mod: PO | Performed by: STUDENT IN AN ORGANIZED HEALTH CARE EDUCATION/TRAINING PROGRAM

## 2024-12-31 PROCEDURE — 83036 HEMOGLOBIN GLYCOSYLATED A1C: CPT | Performed by: STUDENT IN AN ORGANIZED HEALTH CARE EDUCATION/TRAINING PROGRAM

## 2024-12-31 PROCEDURE — 99213 OFFICE O/P EST LOW 20 MIN: CPT | Mod: PBBFAC,PO | Performed by: STUDENT IN AN ORGANIZED HEALTH CARE EDUCATION/TRAINING PROGRAM

## 2024-12-31 PROCEDURE — 99214 OFFICE O/P EST MOD 30 MIN: CPT | Mod: S$PBB,,, | Performed by: STUDENT IN AN ORGANIZED HEALTH CARE EDUCATION/TRAINING PROGRAM

## 2024-12-31 PROCEDURE — G2211 COMPLEX E/M VISIT ADD ON: HCPCS | Mod: S$PBB,,, | Performed by: STUDENT IN AN ORGANIZED HEALTH CARE EDUCATION/TRAINING PROGRAM

## 2024-12-31 PROCEDURE — 83970 ASSAY OF PARATHORMONE: CPT | Performed by: STUDENT IN AN ORGANIZED HEALTH CARE EDUCATION/TRAINING PROGRAM

## 2024-12-31 PROCEDURE — 85027 COMPLETE CBC AUTOMATED: CPT | Mod: PO | Performed by: STUDENT IN AN ORGANIZED HEALTH CARE EDUCATION/TRAINING PROGRAM

## 2024-12-31 PROCEDURE — 84100 ASSAY OF PHOSPHORUS: CPT | Performed by: STUDENT IN AN ORGANIZED HEALTH CARE EDUCATION/TRAINING PROGRAM

## 2024-12-31 PROCEDURE — 36415 COLL VENOUS BLD VENIPUNCTURE: CPT | Mod: PO | Performed by: STUDENT IN AN ORGANIZED HEALTH CARE EDUCATION/TRAINING PROGRAM

## 2024-12-31 PROCEDURE — 99999 PR PBB SHADOW E&M-EST. PATIENT-LVL III: CPT | Mod: PBBFAC,,, | Performed by: STUDENT IN AN ORGANIZED HEALTH CARE EDUCATION/TRAINING PROGRAM

## 2024-12-31 RX ORDER — HYDROCHLOROTHIAZIDE 12.5 MG/1
1 CAPSULE ORAL DAILY
COMMUNITY
Start: 2023-07-13

## 2024-12-31 NOTE — ASSESSMENT & PLAN NOTE
Home readings from November showed pressures between 115-139/60-75. Given lower diastolic pressure, I would like to avoid any increases in her medication. Continue current regimen.

## 2024-12-31 NOTE — PROGRESS NOTES
Name: Leslye Last  MRN: [id number]  : 1935    Subjective   HPI  Patient presents for regular check up. Primary concern is fatigue.     Review of Systems   Constitutional:  Positive for fatigue (does not wake up feeling refreshed; feels sleepy during the day).   Respiratory:  Positive for shortness of breath (on exertion).    Cardiovascular:  Negative for chest pain.   Psychiatric/Behavioral:  Positive for sleep disturbance (wakes up several times per night to urinate but can quickly fall back asleep; snores; only rarely dreams).         Patient Active Problem List   Diagnosis    Macular degeneration of both eyes    Pacemaker    Symptomatic bradycardia    Paroxysmal atrial fibrillation    Primary hypertension    Prediabetes    Mixed hyperlipidemia    Chronic kidney disease, stage 3a    Hyperparathyroidism    Diastolic dysfunction    Mild mitral regurgitation    Mild tricuspid regurgitation    Mild pulmonary valve regurgitation       Current Outpatient Medications on File Prior to Visit   Medication Sig Dispense Refill    hydroCHLOROthiazide (MICROZIDE) 12.5 mg capsule Take 1 capsule by mouth once daily.      metoprolol succinate (TOPROL-XL) 50 MG 24 hr tablet Take 1 tablet by mouth once daily.      olmesartan (BENICAR) 40 MG tablet Take 1 tablet by mouth once daily.      omega-3 fatty acids/fish oil (FISH OIL-OMEGA-3 FATTY ACIDS) 300-1,000 mg capsule Take 2 g by mouth.      vitamin D (VITAMIN D3) 1000 units Tab Take 1 tablet by mouth every morning.      [DISCONTINUED] hydroCHLOROthiazide (HYDRODIURIL) 25 MG tablet Take 25 mg by mouth once daily.      [DISCONTINUED] ascorbic acid, vitamin C, (VITAMIN C) 1000 MG tablet Take 1 tablet by mouth every morning.       No current facility-administered medications on file prior to visit.       Past Medical History:   Diagnosis Date    Bradycardia     s/p pacemaker    Diverticulitis     Hypertension        Past Surgical History:   Procedure Laterality Date    COLON  SURGERY      EYE SURGERY      HERNIA REPAIR      HYSTERECTOMY      INSERTION OF PACEMAKER  12/2022    LAPAROSCOPIC LEFT COLON RESECTION  2010    diverticulitis    LUMBAR SPINE SURGERY  1981    NM, STRESS MYOCARDIAL PERFUSION IMAGING, USING LEXISCAN  11/19/2024    negative for ischemia - performed through [facility]    SHOULDER ARTHROSCOPY Right     SKIN CANCER EXCISION Right 10/2024    lower leg    SPINE SURGERY          Family History   Problem Relation Name Age of Onset    Stroke Mother B Galdino     Alcohol abuse Father C Galdino     Cancer Father C Galdino     Diabetes Father C Galdino     Cancer Brother Victorino Ahmadi     Cancer Sister Latasha Ahmadi     Cancer Son Breezy Last     Diabetes Brother Joao Ahmadi        Social History     Socioeconomic History    Marital status:    Tobacco Use    Smoking status: Never    Smokeless tobacco: Never   Substance and Sexual Activity    Alcohol use: Not Currently     Comment: Less than once monthly - no more than a glass at a time    Drug use: Never    Sexual activity: Not Currently     Birth control/protection: None   Social History Narrative    Lives alone at [facility]. Has family in the area. Enjoys reading, exercising.      Social Drivers of Health     Financial Resource Strain: Low Risk  (4/10/2024)    Overall Financial Resource Strain (CARDIA)     Difficulty of Paying Living Expenses: Not very hard   Food Insecurity: No Food Insecurity (4/10/2024)    Hunger Vital Sign     Worried About Running Out of Food in the Last Year: Never true     Ran Out of Food in the Last Year: Never true   Transportation Needs: No Transportation Needs (4/10/2024)    PRAPARE - Transportation     Lack of Transportation (Medical): No     Lack of Transportation (Non-Medical): No   Physical Activity: Insufficiently Active (4/10/2024)    Exercise Vital Sign     Days of Exercise per Week: 5 days     Minutes of Exercise per Session: 20 min   Stress: No Stress Concern  Present (4/10/2024)    [facility] - Occupational Stress Questionnaire     Feeling of Stress : Only a little   Housing Stability: Low Risk  (4/10/2024)    Housing Stability Vital Sign     Unable to Pay for Housing in the Last Year: No     Number of Places Lived in the Last Year: 1     Unstable Housing in the Last Year: No       Review of patient's allergies indicates:   Allergen Reactions    Amlodipine Edema and Other (See Comments)     swelling      Hydralazine Edema and Swelling    Sulfur Rash        Health Maintenance Due   Topic Date Due    Shingles Vaccine (1 of 2) Never done    RSV Vaccine (Age 60+ and Pregnant patients) (1 - 1-dose 75+ series) Never done    COVID-19 Vaccine (6 - 2024-25 season) 09/01/2024       Objective   Vitals:    12/31/24 0927   BP: (!) 178/64   Pulse: 77        Physical Exam  Constitutional:       General: She is not in acute distress.     Appearance: Normal appearance. She is well-developed.   HENT:      Head: Normocephalic and atraumatic.      Right Ear: External ear normal.      Left Ear: External ear normal.   Eyes:      Conjunctiva/sclera: Conjunctivae normal.   Pulmonary:      Effort: Pulmonary effort is normal. No respiratory distress.   Abdominal:      General: Abdomen is flat. There is no distension.   Musculoskeletal:         General: No swelling or deformity.      Right lower leg: No edema.      Left lower leg: No edema.   Skin:     General: Skin is warm and dry.      Coloration: Skin is not jaundiced.   Neurological:      Mental Status: She is alert and oriented to person, place, and time. Mental status is at baseline.   Psychiatric:         Attention and Perception: Attention and perception normal.         Mood and Affect: Mood normal.         Speech: Speech normal.         Behavior: Behavior normal. Behavior is cooperative.         Thought Content: Thought content normal.         Cognition and Memory: Cognition normal.         Judgment: Judgment  normal.          Assessment & Plan   1. Primary hypertension  Assessment & Plan:  Home readings from November showed pressures between 115-139/60-75. Given lower diastolic pressure, I would like to avoid any increases in her medication. Continue current regimen.      2. Chronic kidney disease, stage 3a  -     CBC Without Differential; Future; Expected date: 12/31/2024  -     Phosphorus; Future; Expected date: 12/31/2024  -     PTH, Intact; Future; Expected date: 12/31/2024  -     Comprehensive Metabolic Panel; Future; Expected date: 12/31/2024    3. Hyperparathyroidism  -     PTH, Intact; Future; Expected date: 12/31/2024    4. Prediabetes  -     Hemoglobin A1C; Future; Expected date: 12/31/2024    5. Mixed hyperlipidemia  -     Lipid Panel; Future; Expected date: 12/31/2024    6. Snoring  -     Home Sleep Study; Future    7. Daytime somnolence  -     Home Sleep Study; Future    8. Other sleep apnea  -     Home Sleep Study; Future    9. Paroxysmal atrial fibrillation  -     CBC Without Differential; Future; Expected date: 12/31/2024  -     Comprehensive Metabolic Panel; Future; Expected date: 12/31/2024    Visit today included increased complexity associated with the care of the episodic problem fatigue addressed and managing the longitudinal care of the patient due to the serious and/or complex managed problem(s) hypertension, atrial fibrillation, chronic kidney disease, prediabetes.    Follow up in 4 months. Discussed RSV vaccine.     Gianfranco Brandon MD  12/31/2024

## 2025-01-03 DIAGNOSIS — R74.8 ELEVATED ALKALINE PHOSPHATASE LEVEL: ICD-10-CM

## 2025-01-03 DIAGNOSIS — R74.8 ELEVATED LIVER ENZYMES: Primary | ICD-10-CM

## 2025-01-06 ENCOUNTER — TELEPHONE (OUTPATIENT)
Dept: FAMILY MEDICINE | Facility: CLINIC | Age: 89
End: 2025-01-06
Payer: MEDICARE

## 2025-01-06 NOTE — TELEPHONE ENCOUNTER
----- Message from Mauricio sent at 1/6/2025  9:51 AM CST -----  Regarding: return call  Contact: Daughter: Laquita  Type:  Patient Returning Call    Who Called:Daughter: Laquita  Who Left Message for Patient:nurse  Does the patient know what this is regarding?:after office visit/ patient went to urgent care for UTI  Would the patient rather a call back or a response via [facility]? Daughter wanted office to know  Best Call Back Number:[phone]  Additional Information:

## 2025-01-07 ENCOUNTER — PATIENT MESSAGE (OUTPATIENT)
Dept: FAMILY MEDICINE | Facility: CLINIC | Age: 89
End: 2025-01-07
Payer: MEDICARE

## 2025-01-09 ENCOUNTER — OFFICE VISIT (OUTPATIENT)
Dept: FAMILY MEDICINE | Facility: CLINIC | Age: 89
End: 2025-01-09
Payer: MEDICARE

## 2025-01-09 DIAGNOSIS — R74.8 ELEVATED LIVER ENZYMES: Primary | ICD-10-CM

## 2025-01-09 DIAGNOSIS — R74.8 ELEVATED ALKALINE PHOSPHATASE LEVEL: ICD-10-CM

## 2025-01-09 NOTE — PROGRESS NOTES
The patient location is: [place]  The chief complaint leading to consultation is: Labs    Visit type: audiovisual    Notes:    HPI  Patient presents about lab work. I saw her not long ago - lab work showed elevated AST and ALT alongside elevated alkaline phosphatase. She developed urinary tract infection symptoms several days later and went to urgent care. She wanted to discuss whether a latent urinary tract infection could have influenced her lab work.    Review of Systems   Constitutional:  Negative for activity change and unexpected weight change.   HENT:  Negative for hearing loss, rhinorrhea and trouble swallowing.    Eyes:  Negative for discharge and visual disturbance.   Respiratory:  Negative for chest tightness and wheezing.    Cardiovascular:  Negative for chest pain and palpitations.   Gastrointestinal:  Negative for blood in stool, constipation, diarrhea and vomiting.   Endocrine: Negative for polydipsia and polyuria.   Genitourinary:  Negative for difficulty urinating, dysuria, hematuria and menstrual problem.   Musculoskeletal:  Negative for arthralgias, joint swelling and neck pain.   Neurological:  Negative for weakness and headaches.   Psychiatric/Behavioral:  Negative for confusion and dysphoric mood.        Objective:  Physical Exam  Constitutional:       Appearance: Normal appearance.   HENT:      Head: Normocephalic and atraumatic.   Pulmonary:      Effort: Pulmonary effort is normal. No respiratory distress.   Neurological:      Mental Status: She is alert and oriented to person, place, and time. Mental status is at baseline.   Psychiatric:         Mood and Affect: Mood normal.         Behavior: Behavior normal.         Thought Content: Thought content normal.         Judgment: Judgment normal.         Plan:  1. Elevated liver enzymes    2. Elevated alkaline phosphatase level    I do not think any latent urinary tract infection would have caused any of the abnormalities seen on the blood  work. Follow up with scheduled ultrasound.     Follow up as previously scheduled.     Face to Face time with patient: 6 minutes  9 minutes of total time spent on the encounter, which includes face to face time and non-face to face time preparing to see the patient (eg, review of tests), Obtaining and/or reviewing separately obtained history, Documenting clinical information in the electronic or other health record, Independently interpreting results (not separately reported) and communicating results to the patient/family/caregiver, or Care coordination (not separately reported).     Each patient to whom he or she provides medical services by telemedicine is:  (1) informed of the relationship between the physician and patient and the respective role of any other health care provider with respect to management of the patient; and (2) notified that he or she may decline to receive medical services by telemedicine and may withdraw from such care at any time.

## 2025-01-23 PROBLEM — G47.33 OSA (OBSTRUCTIVE SLEEP APNEA): Status: ACTIVE | Noted: 2025-01-23

## 2025-05-06 ENCOUNTER — RESULTS FOLLOW-UP (OUTPATIENT)
Dept: FAMILY MEDICINE | Facility: CLINIC | Age: 89
End: 2025-05-06

## 2025-05-06 ENCOUNTER — OFFICE VISIT (OUTPATIENT)
Dept: FAMILY MEDICINE | Facility: CLINIC | Age: 89
End: 2025-05-06
Payer: MEDICARE

## 2025-05-06 ENCOUNTER — LAB VISIT (OUTPATIENT)
Dept: LAB | Facility: HOSPITAL | Age: 89
End: 2025-05-06
Attending: STUDENT IN AN ORGANIZED HEALTH CARE EDUCATION/TRAINING PROGRAM
Payer: MEDICARE

## 2025-05-06 VITALS
WEIGHT: 185.06 LBS | DIASTOLIC BLOOD PRESSURE: 80 MMHG | HEIGHT: 66 IN | BODY MASS INDEX: 29.74 KG/M2 | OXYGEN SATURATION: 95 % | SYSTOLIC BLOOD PRESSURE: 220 MMHG | HEART RATE: 71 BPM

## 2025-05-06 DIAGNOSIS — I48.0 PAROXYSMAL ATRIAL FIBRILLATION: ICD-10-CM

## 2025-05-06 DIAGNOSIS — E66.3 OVERWEIGHT (BMI 25.0-29.9): ICD-10-CM

## 2025-05-06 DIAGNOSIS — G47.33 OSA (OBSTRUCTIVE SLEEP APNEA): ICD-10-CM

## 2025-05-06 DIAGNOSIS — N18.31 CHRONIC KIDNEY DISEASE, STAGE 3A: ICD-10-CM

## 2025-05-06 DIAGNOSIS — I10 PRIMARY HYPERTENSION: Primary | ICD-10-CM

## 2025-05-06 DIAGNOSIS — M65.349 TRIGGER RING FINGER, UNSPECIFIED LATERALITY: ICD-10-CM

## 2025-05-06 DIAGNOSIS — I10 PRIMARY HYPERTENSION: ICD-10-CM

## 2025-05-06 LAB
ALBUMIN SERPL BCP-MCNC: 4.2 G/DL (ref 3.5–5.2)
ALP SERPL-CCNC: 88 UNIT/L (ref 40–150)
ALT SERPL W/O P-5'-P-CCNC: 25 UNIT/L (ref 10–44)
ANION GAP (OHS): 11 MMOL/L (ref 8–16)
AST SERPL-CCNC: 23 UNIT/L (ref 11–45)
BILIRUB SERPL-MCNC: 0.6 MG/DL (ref 0.1–1)
BUN SERPL-MCNC: 26 MG/DL (ref 8–23)
CALCIUM SERPL-MCNC: 10.4 MG/DL (ref 8.7–10.5)
CHLORIDE SERPL-SCNC: 101 MMOL/L (ref 95–110)
CO2 SERPL-SCNC: 26 MMOL/L (ref 23–29)
CREAT SERPL-MCNC: 1 MG/DL (ref 0.5–1.4)
GFR SERPLBLD CREATININE-BSD FMLA CKD-EPI: 55 ML/MIN/1.73/M2
GLUCOSE SERPL-MCNC: 116 MG/DL (ref 70–110)
POTASSIUM SERPL-SCNC: 4.3 MMOL/L (ref 3.5–5.1)
PROT SERPL-MCNC: 7.5 GM/DL (ref 6–8.4)
SODIUM SERPL-SCNC: 138 MMOL/L (ref 136–145)

## 2025-05-06 PROCEDURE — 99214 OFFICE O/P EST MOD 30 MIN: CPT | Mod: S$PBB,,, | Performed by: STUDENT IN AN ORGANIZED HEALTH CARE EDUCATION/TRAINING PROGRAM

## 2025-05-06 PROCEDURE — G2211 COMPLEX E/M VISIT ADD ON: HCPCS | Mod: S$PBB,,, | Performed by: STUDENT IN AN ORGANIZED HEALTH CARE EDUCATION/TRAINING PROGRAM

## 2025-05-06 PROCEDURE — 99999 PR PBB SHADOW E&M-EST. PATIENT-LVL IV: CPT | Mod: PBBFAC,,, | Performed by: STUDENT IN AN ORGANIZED HEALTH CARE EDUCATION/TRAINING PROGRAM

## 2025-05-06 PROCEDURE — 82040 ASSAY OF SERUM ALBUMIN: CPT | Mod: PO

## 2025-05-06 PROCEDURE — 36415 COLL VENOUS BLD VENIPUNCTURE: CPT | Mod: PO

## 2025-05-06 PROCEDURE — 99214 OFFICE O/P EST MOD 30 MIN: CPT | Mod: PBBFAC,PO | Performed by: STUDENT IN AN ORGANIZED HEALTH CARE EDUCATION/TRAINING PROGRAM

## 2025-05-06 RX ORDER — DICLOFENAC SODIUM 10 MG/G
2 GEL TOPICAL DAILY
Qty: 20 G | Refills: 3 | Status: SHIPPED | OUTPATIENT
Start: 2025-05-06

## 2025-05-06 RX ORDER — SPIRONOLACTONE 25 MG/1
25 TABLET ORAL DAILY
Qty: 30 TABLET | Refills: 11 | Status: SHIPPED | OUTPATIENT
Start: 2025-05-06 | End: 2026-05-06

## 2025-05-06 NOTE — ASSESSMENT & PLAN NOTE
Patient states she is still having issues with daytime fatigue. HST may have underestimated the severity of her sleep apnea, but either way I want her to establish with pulmonology/sleep medicine about how hypoxic she was during her events. Contact information provided in After Visit Summary.

## 2025-05-06 NOTE — PATIENT INSTRUCTIONS
Call [facility] to schedule  [address] [facility], [address]  [place] [address]  Ph: [phone]     For evidence-based information on weight loss through diet, visit the following website:  https://www.nhlbi.nih.gov/health/educational/lose_wt/index.htm    To estimate the amount of calories you need in a day to maintain or lose weight:  https://www.calculator.net/calorie-calculator.html     Pt reports pain to RUQ of 8/10; Dr. Johnson aware.

## 2025-05-06 NOTE — PROGRESS NOTES
Name: Leslye Last  MRN: [id number]  : 1935  PCP: Gianfranco Brandon MD    Subjective   HPI  Patient presents for follow up of chronic kidney disease, hypertension.    Review of Systems   Musculoskeletal:  Positive for arthralgias (bilateral ring fingers getting stuck).        Problem List[1]    Medications Ordered Prior to Encounter[2]    Past Medical History:   Diagnosis Date    Bradycardia     s/p pacemaker    Diverticulitis     Hypertension        Past Surgical History:   Procedure Laterality Date    COLON SURGERY      EYE SURGERY      HERNIA REPAIR      HYSTERECTOMY      INSERTION OF PACEMAKER  2022    LAPAROSCOPIC LEFT COLON RESECTION  2010    diverticulitis    LUMBAR SPINE SURGERY  1981    NM, STRESS MYOCARDIAL PERFUSION IMAGING, USING LEXISCAN  2024    negative for ischemia - performed through [facility]    SHOULDER ARTHROSCOPY Right     SKIN CANCER EXCISION Right 10/2024    lower leg    SPINE SURGERY          Family History   Problem Relation Name Age of Onset    Stroke Mother B Galdino     Alcohol abuse Father C Galdino     Cancer Father C Galdino     Diabetes Father C Galdino     Cancer Brother Victorino Ahmadi     Cancer Sister Latasha Ahmadi     Cancer Son Breezy Last     Diabetes Brother Joao Ahmadi        Social History[3]    Review of patient's allergies indicates:   Allergen Reactions    Amlodipine Edema and Other (See Comments)     swelling      Hydralazine Edema and Swelling    Sulfur Rash        Health Maintenance Due   Topic Date Due    Shingles Vaccine (1 of 2) Never done    RSV Vaccine (Age 60+ and Pregnant patients) (1 - 1-dose 75+ series) Never done    COVID-19 Vaccine ( season) 2024       Objective   Vitals:    25 0923   BP: (!) 220/80   Pulse:         Physical Exam  Constitutional:       General: She is not in acute distress.     Appearance: Normal appearance. She is well-developed.   HENT:      Head: Normocephalic and atraumatic.      Right Ear: External  ear normal.      Left Ear: External ear normal.   Eyes:      Conjunctiva/sclera: Conjunctivae normal.   Pulmonary:      Effort: Pulmonary effort is normal. No respiratory distress.   Abdominal:      General: Abdomen is flat. There is no distension.   Musculoskeletal:         General: No swelling or deformity.      Right lower leg: No edema.      Left lower leg: No edema.      Comments: Trigger finger noted in bilateral ring fingers.    Skin:     General: Skin is warm and dry.      Coloration: Skin is not jaundiced.   Neurological:      Mental Status: She is alert and oriented to person, place, and time. Mental status is at baseline.   Psychiatric:         Attention and Perception: Attention and perception normal.         Mood and Affect: Mood normal.         Speech: Speech normal.         Behavior: Behavior normal. Behavior is cooperative.         Thought Content: Thought content normal.         Cognition and Memory: Cognition normal.         Judgment: Judgment normal.          Assessment & Plan    1. Primary hypertension  Assessment & Plan:  Significantly elevated. Home readings have shown systolic readings mostly in the 130s-150s range. She says she has confirmed the validity of her home cuff at Dr. Beltrán's office. Given how high it is today and my preference to have her blood pressure readings < 130, I will start spironolactone. Closely monitor eGFR - if it dips below 30, we will need to stop the spironolactone. Continue olmesartan, hydrochlorothiazide, and metoprolol.    Orders:  -     spironolactone (ALDACTONE) 25 MG tablet; Take 1 tablet (25 mg total) by mouth once daily.  Dispense: 30 tablet; Refill: 11  -     Comprehensive Metabolic Panel; Future; Expected date: 05/06/2025    2. Chronic kidney disease, stage 3a    3. Paroxysmal atrial fibrillation  Assessment & Plan:  Continue metoprolol. Follow up with Dr. Beltrán.      4. GLEN (obstructive sleep apnea)  Overview:  Home sleep study 1/14/2025: Mild  obstructive sleep apnea with a respiratory event index based on >=3% oxygen desaturation of 11.5 per hour and a respiratory event index based on >=4% desat of 11.4 per hour. Lowest oxygen desaturation was 75.0%. Patient spent 87.3 minutes     Assessment & Plan:  Patient states she is still having issues with daytime fatigue. HST may have underestimated the severity of her sleep apnea, but either way I want her to establish with pulmonology/sleep medicine about how hypoxic she was during her events. Contact information provided in After Visit Summary.        5. Overweight (BMI 25.0-29.9)  Assessment & Plan:  Patient asked about weight loss. Discussed calorie restriction. Given her age and sedentary lifestyle (currently dependent on cane, sometimes walker), it may be difficult for her to lose weight.      6. Trigger ring finger, unspecified laterality  Assessment & Plan:  Scheduled to see Dr. Fishman with [facility]. Currently doing therapy with her hands. Add diclofenac gel.    Orders:  -     diclofenac sodium (VOLTAREN) 1 % Gel; Apply 2 g topically once daily.  Dispense: 20 g; Refill: 3    Visit today included increased complexity associated with the care of the episodic problem hand pain addressed and managing the longitudinal care of the patient due to the serious and/or complex managed problem(s) as above.       Follow up in 3 months.     Gianfranco Brandon MD  05/06/2025          [1]   Patient Active Problem List  Diagnosis    Macular degeneration of both eyes    Pacemaker    Symptomatic bradycardia    Paroxysmal atrial fibrillation    Primary hypertension    Prediabetes    Mixed hyperlipidemia    Chronic kidney disease, stage 3a    Hyperparathyroidism    Diastolic dysfunction    Mild mitral regurgitation    Mild tricuspid regurgitation    Mild pulmonary valve regurgitation    GLEN (obstructive sleep apnea)    Trigger ring finger    Overweight (BMI 25.0-29.9)   [2]   Current Outpatient Medications on File Prior to  Visit   Medication Sig Dispense Refill    antiox #8/om3/dha/epa/lut/zeax (PRESERVISION AREDS 2, OMEGA-3, ORAL) Take by mouth.      hydroCHLOROthiazide (MICROZIDE) 12.5 mg capsule Take 1 capsule by mouth once daily.      metoprolol succinate (TOPROL-XL) 50 MG 24 hr tablet Take 1 tablet by mouth once daily.      mv-mn/iron fum/FA/omega3,6,9#3 (WOMEN'S MULTI ORAL) Take by mouth.      olmesartan (BENICAR) 40 MG tablet Take 1 tablet by mouth once daily.      omega-3 fatty acids/fish oil (FISH OIL-OMEGA-3 FATTY ACIDS) 300-1,000 mg capsule Take 2 g by mouth.      vitamin D (VITAMIN D3) 1000 units Tab Take 1 tablet by mouth every morning.       No current facility-administered medications on file prior to visit.   [3]   Social History  Socioeconomic History    Marital status:    Tobacco Use    Smoking status: Never    Smokeless tobacco: Never   Substance and Sexual Activity    Alcohol use: Not Currently     Comment: Less than once monthly - no more than a glass at a time    Drug use: Never    Sexual activity: Not Currently     Birth control/protection: None   Social History Narrative    Lives alone at [facility]. Has family in the area. Enjoys reading, exercising.      Social Drivers of Health     Financial Resource Strain: Low Risk  (4/10/2024)    Overall Financial Resource Strain (CARDIA)     Difficulty of Paying Living Expenses: Not very hard   Food Insecurity: No Food Insecurity (4/10/2024)    Hunger Vital Sign     Worried About Running Out of Food in the Last Year: Never true     Ran Out of Food in the Last Year: Never true   Transportation Needs: No Transportation Needs (4/10/2024)    PRAPARE - Transportation     Lack of Transportation (Medical): No     Lack of Transportation (Non-Medical): No   Physical Activity: Insufficiently Active (4/10/2024)    Exercise Vital Sign     Days of Exercise per Week: 5 days     Minutes of Exercise per Session: 20 min   Stress: No Stress Concern Present  (4/10/2024)    [employer] - Occupational Stress Questionnaire     Feeling of Stress : Only a little   Housing Stability: Low Risk  (4/10/2024)    Housing Stability Vital Sign     Unable to Pay for Housing in the Last Year: No     Number of Places Lived in the Last Year: 1     Unstable Housing in the Last Year: No

## 2025-05-06 NOTE — ASSESSMENT & PLAN NOTE
Patient asked about weight loss. Discussed calorie restriction. Given her age and sedentary lifestyle (currently dependent on cane, sometimes walker), it may be difficult for her to lose weight.

## 2025-05-06 NOTE — ASSESSMENT & PLAN NOTE
Significantly elevated. Home readings have shown systolic readings mostly in the 130s-150s range. She says she has confirmed the validity of her home cuff at Dr. Beltrán's office. Given how high it is today and my preference to have her blood pressure readings < 130, I will start spironolactone. Closely monitor eGFR - if it dips below 30, we will need to stop the spironolactone. Continue olmesartan, hydrochlorothiazide, and metoprolol.

## 2025-07-03 LAB
ALBUMIN: 4
ALP SERPL-CCNC: 87 U/L (ref 25–125)
ALT SERPL W P-5'-P-CCNC: 22 U/L (ref 7–35)
AST SERPL-CCNC: 24 U/L (ref 13–35)
BASOPHILS # BLD AUTO: 0.1 K/UL
BASOPHILS NFR BLD AUTO: 1 %
BILIRUB SERPL-MCNC: 0.4 MG/DL (ref 0.1–1.4)
BUN SERPL-MCNC: 25 MG/DL
BUN/CREAT RATIO: 28
CALCIUM SERPL-MCNC: 10 MG/DL
CHLORIDE SERPL-SCNC: 100 MMOL/L (ref 99–108)
CO2 SERPL-SCNC: 26 MMOL/L
CREAT SERPL-MCNC: 0.9 MG/DL
EGFR: 62
EOSINOPHIL # BLD AUTO: 0.1 K/UL
EOSINOPHIL NFR BLD AUTO: 2 %
ERYTHROCYTE [DISTWIDTH] IN BLOOD BY AUTOMATED COUNT: 14.5 %
GLOBULIN SER CALC-MCNC: 2.4 G/L
GLUCOSE SERPL-MCNC: 113 MG/DL
HCT VFR BLD AUTO: 39.5 %
HGB BLD-MCNC: 12.2 G/DL
IMM GRANULOCYTES # BLD AUTO: 0 K/UL
IMM GRANULOCYTES NFR BLD: 0 %
LYMPHOCYTES # BLD AUTO: 2.6 K/UL
LYMPHOCYTES NFR BLD AUTO: 40 %
MCH RBC QN AUTO: 29.2 PG
MCHC RBC AUTO-ENTMCNC: 30.9 G/DL
MCV RBC AUTO: 95 FL
MONOCYTES # BLD AUTO: 1.1 K/UL
MONOCYTES NFR BLD AUTO: 16 %
NEUTROPHILS # BLD AUTO: 2.7 K/UL
NEUTROPHILS NFR BLD AUTO: 41 %
NT-PROBNP: 499
PLATELETS: 181
POTASSIUM SERPL-SCNC: 4.7 MMOL/L (ref 3.4–5.3)
RBC # BLD AUTO: 4.18 10*6/UL
SODIUM BLD-SCNC: 138 MMOL/L (ref 137–147)
TOTAL PROTEIN: 6.4 G/DL (ref 6.4–8.2)
WBC # BLD: 6.6 10*3/UL

## 2025-07-18 PROBLEM — R06.00 DYSPNEA: Status: ACTIVE | Noted: 2025-07-18

## 2025-08-07 ENCOUNTER — LAB VISIT (OUTPATIENT)
Dept: LAB | Facility: HOSPITAL | Age: 89
End: 2025-08-07
Attending: STUDENT IN AN ORGANIZED HEALTH CARE EDUCATION/TRAINING PROGRAM
Payer: MEDICARE

## 2025-08-07 ENCOUNTER — OFFICE VISIT (OUTPATIENT)
Dept: FAMILY MEDICINE | Facility: CLINIC | Age: 89
End: 2025-08-07
Payer: MEDICARE

## 2025-08-07 VITALS
WEIGHT: 184.44 LBS | DIASTOLIC BLOOD PRESSURE: 78 MMHG | BODY MASS INDEX: 29.64 KG/M2 | OXYGEN SATURATION: 97 % | SYSTOLIC BLOOD PRESSURE: 162 MMHG | HEART RATE: 81 BPM | HEIGHT: 66 IN | RESPIRATION RATE: 18 BRPM

## 2025-08-07 DIAGNOSIS — R73.9 HYPERGLYCEMIA: ICD-10-CM

## 2025-08-07 DIAGNOSIS — N18.31 CHRONIC KIDNEY DISEASE, STAGE 3A: ICD-10-CM

## 2025-08-07 DIAGNOSIS — I10 PRIMARY HYPERTENSION: Primary | ICD-10-CM

## 2025-08-07 DIAGNOSIS — M70.52 PES ANSERINUS BURSITIS OF LEFT KNEE: ICD-10-CM

## 2025-08-07 LAB
EAG (OHS): 123 MG/DL (ref 68–131)
HBA1C MFR BLD: 5.9 % (ref 4–5.6)

## 2025-08-07 PROCEDURE — 99214 OFFICE O/P EST MOD 30 MIN: CPT | Mod: S$PBB,,, | Performed by: STUDENT IN AN ORGANIZED HEALTH CARE EDUCATION/TRAINING PROGRAM

## 2025-08-07 PROCEDURE — 36415 COLL VENOUS BLD VENIPUNCTURE: CPT | Mod: PO

## 2025-08-07 PROCEDURE — 99999 PR PBB SHADOW E&M-EST. PATIENT-LVL IV: CPT | Mod: PBBFAC,,, | Performed by: STUDENT IN AN ORGANIZED HEALTH CARE EDUCATION/TRAINING PROGRAM

## 2025-08-07 PROCEDURE — 99214 OFFICE O/P EST MOD 30 MIN: CPT | Mod: PBBFAC,PO | Performed by: STUDENT IN AN ORGANIZED HEALTH CARE EDUCATION/TRAINING PROGRAM

## 2025-08-07 PROCEDURE — G2211 COMPLEX E/M VISIT ADD ON: HCPCS | Mod: ,,, | Performed by: STUDENT IN AN ORGANIZED HEALTH CARE EDUCATION/TRAINING PROGRAM

## 2025-08-07 PROCEDURE — 83036 HEMOGLOBIN GLYCOSYLATED A1C: CPT

## 2025-08-07 RX ORDER — POTASSIUM CHLORIDE 750 MG/1
10 TABLET, EXTENDED RELEASE ORAL DAILY
COMMUNITY

## 2025-08-07 RX ORDER — AMLODIPINE BESYLATE 5 MG/1
5 TABLET ORAL DAILY
COMMUNITY
End: 2025-08-07 | Stop reason: CLARIF

## 2025-08-07 RX ORDER — FUROSEMIDE 20 MG/1
20 TABLET ORAL DAILY
COMMUNITY

## 2025-08-07 NOTE — ASSESSMENT & PLAN NOTE
Not at goal. She brought home pressures - recent readings vary as wildly as 130s systolic through 160s systolic. I had started her on spironolactone last visit - she says it seemed to control her blood pressure well for a while but then her blood pressure started to creep upward and she generally felt ill. She saw Dr. Andrew who stopped hydrochlorothiazide and spironolactone, replacing it with furosemide 20mg daily and potassium supplement. She feels better since the change. I made some recommendations to improve blood pressure, but we ultimately decided that we should leave blood pressure management to one doctor (Dr. Andrew). Continue current regimen.

## 2025-08-07 NOTE — PROGRESS NOTES
Name: Leslye Last  MRN: [id number]  : 1935  PCP: Gianfranco Brandon MD    Subjective   HPI  Patient presents for follow up of blood pressure.     Review of Systems   Musculoskeletal:  Positive for arthralgias (left knee stiffness after sitting for a while, better with walking around).        Trigger finger in bilateral hands - following with hand specialists        Problem List[1]    Medications Ordered Prior to Encounter[2]    Past Medical History:   Diagnosis Date    Bradycardia     s/p pacemaker    Diverticulitis     Hypertension        Past Surgical History:   Procedure Laterality Date    COLON SURGERY      EYE SURGERY      HERNIA REPAIR      HYSTERECTOMY      INSERTION OF PACEMAKER  2022    LAPAROSCOPIC LEFT COLON RESECTION      diverticulitis    LUMBAR SPINE SURGERY      NM, STRESS MYOCARDIAL PERFUSION IMAGING, USING LEXISCAN  2024    negative for ischemia - performed through [facility]    SHOULDER ARTHROSCOPY Right     SKIN CANCER EXCISION Right 10/2024    lower leg    SPINE SURGERY          Family History   Problem Relation Name Age of Onset    Stroke Mother B Galdino     Alcohol abuse Father C Galdino     Cancer Father C Galdino     Diabetes Father C Galdino     Cancer Brother Victorino Ahmadi     Cancer Sister Latasha Ahmadi     Cancer Son Breezy Last     Diabetes Brother Joao Ahmadi        Social History[3]    Review of patient's allergies indicates:   Allergen Reactions    Amlodipine Edema and Other (See Comments)     swelling      Hydralazine Edema and Swelling    Sulfur Rash        Health Maintenance Due   Topic Date Due    Shingles Vaccine (1 of 2) Never done    RSV Vaccine (Age 60+ and Pregnant patients) (1 - 1-dose 75+ series) Never done    COVID-19 Vaccine ( season) 2024       Objective   Vitals:    25 1028   BP: (!) 140/80   Pulse: 81   Resp: 18      Wt Readings from Last 1 Encounters:   25 83.7 kg (184 lb 6.6 oz)   ]  Body mass index is 29.77  kg/m².    Physical Exam  Musculoskeletal:      Right hand: Decreased range of motion (trigger finger in middle and ring fingers).      Left hand: Decreased range of motion (trigger finger in ring finger).      Left knee: Tenderness present over the medial joint line. No lateral joint line or patellar tendon tenderness.          Assessment & Plan    1. Primary hypertension  Assessment & Plan:  Not at goal. She brought home pressures - recent readings vary as wildly as 130s systolic through 160s systolic. I had started her on spironolactone last visit - she says it seemed to control her blood pressure well for a while but then her blood pressure started to creep upward and she generally felt ill. She saw Dr. Andrew who stopped hydrochlorothiazide and spironolactone, replacing it with furosemide 20mg daily and potassium supplement. She feels better since the change. I made some recommendations to improve blood pressure, but we ultimately decided that we should leave blood pressure management to one doctor (Dr. Andrew). Continue current regimen.      2. Hyperglycemia  -     Cancel: Hemoglobin A1C; Future  -     Hemoglobin A1C; Future    3. Chronic kidney disease, stage 3a  Assessment & Plan:  Recent lab work in early July through Dr. Andrew showed eGFR 62. Continue to monitor.      4. Pes anserinus bursitis of left knee  Assessment & Plan:  Discussed regular stretching and exercises. Continue to monitor.      Visit today included increased complexity associated with the care of the episodic problem knee pain addressed and managing the longitudinal care of the patient due to the serious and/or complex managed problem(s) as above.      Follow up in 4 months. I spent 33 minutes pre-charting, interviewing patient, performing exam, formulating and discussing plan, placing orders, and documenting.     Gianfranco Brandon MD  08/07/2025          [1]   Patient Active Problem List  Diagnosis    Macular degeneration of both  eyes    Pacemaker    Symptomatic bradycardia    Paroxysmal atrial fibrillation    Primary hypertension    Prediabetes    Mixed hyperlipidemia    Chronic kidney disease, stage 3a    Hyperparathyroidism    Diastolic dysfunction    Mild mitral regurgitation    Mild tricuspid regurgitation    Mild pulmonary valve regurgitation    GLEN (obstructive sleep apnea)    Trigger ring finger    Overweight (BMI 25.0-29.9)    Dyspnea   [2]   Current Outpatient Medications on File Prior to Visit   Medication Sig Dispense Refill    antiox #8/om3/dha/epa/lut/zeax (PRESERVISION AREDS 2, OMEGA-3, ORAL) Take by mouth.      diclofenac sodium (VOLTAREN) 1 % Gel Apply 2 g topically once daily. 20 g 3    metoprolol succinate (TOPROL-XL) 50 MG 24 hr tablet Take 1 tablet by mouth once daily.      mv-mn/iron fum/FA/omega3,6,9#3 (WOMEN'S MULTI ORAL) Take by mouth.      olmesartan (BENICAR) 40 MG tablet Take 1 tablet by mouth once daily.      omega-3 fatty acids/fish oil (FISH OIL-OMEGA-3 FATTY ACIDS) 300-1,000 mg capsule Take 2 g by mouth.      vitamin D (VITAMIN D3) 1000 units Tab Take 1 tablet by mouth every morning.      hydroCHLOROthiazide (MICROZIDE) 12.5 mg capsule Take 1 capsule by mouth once daily.      spironolactone (ALDACTONE) 25 MG tablet Take 1 tablet (25 mg total) by mouth once daily. 30 tablet 11     No current facility-administered medications on file prior to visit.   [3]   Social History  Socioeconomic History    Marital status:    Tobacco Use    Smoking status: Never    Smokeless tobacco: Never   Substance and Sexual Activity    Alcohol use: Not Currently     Comment: Less than once monthly - no more than a glass at a time    Drug use: Never    Sexual activity: Not Currently     Birth control/protection: None   Social History Narrative    Lives alone at [facility]. Has family in the area. Enjoys reading, exercising.      Social Drivers of Health     Financial Resource Strain: Patient Declined  (8/6/2025)    Overall Financial Resource Strain (CARDIA)     Difficulty of Paying Living Expenses: Patient declined   Food Insecurity: Patient Declined (8/6/2025)    Hunger Vital Sign     Worried About Running Out of Food in the Last Year: Patient declined     Ran Out of Food in the Last Year: Patient declined   Transportation Needs: No Transportation Needs (8/6/2025)    PRAPARE - Transportation     Lack of Transportation (Medical): No     Lack of Transportation (Non-Medical): No   Physical Activity: Unknown (8/6/2025)    Exercise Vital Sign     Days of Exercise per Week: 2 days   Stress: No Stress Concern Present (8/6/2025)    [employer] [place] [employer] - Occupational Stress Questionnaire     Feeling of Stress : Not at all   Housing Stability: Patient Declined (8/6/2025)    Housing Stability Vital Sign     Unable to Pay for Housing in the Last Year: Patient declined     Homeless in the Last Year: Patient declined